# Patient Record
Sex: FEMALE | Race: WHITE | URBAN - NONMETROPOLITAN AREA
[De-identification: names, ages, dates, MRNs, and addresses within clinical notes are randomized per-mention and may not be internally consistent; named-entity substitution may affect disease eponyms.]

---

## 2020-03-06 ENCOUNTER — IMPORTED ENCOUNTER (OUTPATIENT)
Dept: URBAN - NONMETROPOLITAN AREA CLINIC 1 | Facility: CLINIC | Age: 65
End: 2020-03-06

## 2020-03-06 PROBLEM — H52.4: Noted: 2020-03-06

## 2020-03-06 PROBLEM — H25.13: Noted: 2020-03-06

## 2020-03-06 PROCEDURE — 92015 DETERMINE REFRACTIVE STATE: CPT

## 2020-03-06 PROCEDURE — 92004 COMPRE OPH EXAM NEW PT 1/>: CPT

## 2020-03-06 NOTE — PATIENT DISCUSSION
Presbyopia OUDiscussed refractive status in detail with patient. New glasses Rx given today. Continue to monitor. Rick OUDiscussed diagnosis with patient. Reviewed symptoms related to cataract progression. Discussed various treatment options with patient. No treatment required at this time. Continue to monitor.

## 2022-04-09 ASSESSMENT — VISUAL ACUITY
OD_CC: 20/20-
OS_CC: 20/25

## 2022-04-09 ASSESSMENT — TONOMETRY
OD_IOP_MMHG: 13
OS_IOP_MMHG: 13